# Patient Record
Sex: MALE | Race: WHITE | NOT HISPANIC OR LATINO | Employment: FULL TIME | URBAN - NONMETROPOLITAN AREA
[De-identification: names, ages, dates, MRNs, and addresses within clinical notes are randomized per-mention and may not be internally consistent; named-entity substitution may affect disease eponyms.]

---

## 2018-03-07 ENCOUNTER — APPOINTMENT (EMERGENCY)
Dept: RADIOLOGY | Facility: HOSPITAL | Age: 28
End: 2018-03-07
Payer: COMMERCIAL

## 2018-03-07 ENCOUNTER — HOSPITAL ENCOUNTER (EMERGENCY)
Facility: HOSPITAL | Age: 28
Discharge: HOME | End: 2018-03-07
Attending: EMERGENCY MEDICINE | Admitting: EMERGENCY MEDICINE
Payer: COMMERCIAL

## 2018-03-07 VITALS
SYSTOLIC BLOOD PRESSURE: 138 MMHG | HEART RATE: 105 BPM | DIASTOLIC BLOOD PRESSURE: 97 MMHG | RESPIRATION RATE: 18 BRPM | BODY MASS INDEX: 49.34 KG/M2 | WEIGHT: 315 LBS | OXYGEN SATURATION: 97 % | TEMPERATURE: 99.2 F
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
## 2018-03-07 DIAGNOSIS — Z23 RABIES, NEED FOR PROPHYLACTIC VACCINATION AGAINST: ICD-10-CM

## 2018-03-07 DIAGNOSIS — S81.052A: Primary | ICD-10-CM

## 2018-03-07 PROCEDURE — 96372 THER/PROPH/DIAG INJ SC/IM: CPT

## 2018-03-07 PROCEDURE — 90715 TDAP VACCINE 7 YRS/> IM: CPT | Performed by: EMERGENCY MEDICINE

## 2018-03-07 PROCEDURE — 73590 X-RAY EXAM OF LOWER LEG: CPT

## 2018-03-07 PROCEDURE — 90376 RABIES IG HEAT TREATED: CPT | Performed by: EMERGENCY MEDICINE

## 2018-03-07 PROCEDURE — 73564 X-RAY EXAM KNEE 4 OR MORE: CPT

## 2018-03-07 PROCEDURE — 90675 RABIES VACCINE IM: CPT | Performed by: EMERGENCY MEDICINE

## 2018-03-07 PROCEDURE — 99283 EMERGENCY DEPT VISIT LOW MDM: CPT

## 2018-03-07 PROCEDURE — 90472 IMMUNIZATION ADMIN EACH ADD: CPT

## 2018-03-07 PROCEDURE — 90471 IMMUNIZATION ADMIN: CPT

## 2018-03-07 RX ORDER — CLINDAMYCIN HYDROCHLORIDE 150 MG/1
450 CAPSULE ORAL EVERY 8 HOURS SCHEDULED
Qty: 90 CAPSULE | Refills: 0 | Status: SHIPPED | OUTPATIENT
Start: 2018-03-07 | End: 2018-03-17

## 2018-03-07 RX ORDER — IBUPROFEN 400 MG/1
400 TABLET ORAL ONCE
Status: DISCONTINUED | OUTPATIENT
Start: 2018-03-07 | End: 2018-03-07 | Stop reason: HOSPADM

## 2018-03-07 RX ORDER — CLINDAMYCIN HYDROCHLORIDE 150 MG/1
450 CAPSULE ORAL ONCE
Status: COMPLETED | OUTPATIENT
Start: 2018-03-07 | End: 2018-03-07

## 2018-03-07 RX ORDER — GINSENG 100 MG
1 CAPSULE ORAL ONCE
Status: COMPLETED | OUTPATIENT
Start: 2018-03-07 | End: 2018-03-07

## 2018-03-07 RX ORDER — ONDANSETRON 4 MG/1
4 TABLET, ORALLY DISINTEGRATING ORAL ONCE
Status: COMPLETED | OUTPATIENT
Start: 2018-03-07 | End: 2018-03-07

## 2018-03-07 RX ADMIN — HUMAN RABIES VIRUS IMMUNE GLOBULIN 3600 UNITS: 150 INJECTION, SOLUTION INTRAMUSCULAR at 18:06

## 2018-03-07 RX ADMIN — ONDANSETRON 4 MG: 4 TABLET, ORALLY DISINTEGRATING ORAL at 18:12

## 2018-03-07 RX ADMIN — TETANUS TOXOID, REDUCED DIPHTHERIA TOXOID AND ACELLULAR PERTUSSIS VACCINE, ADSORBED 0.5 ML: 5; 2.5; 8; 8; 2.5 SUSPENSION INTRAMUSCULAR at 18:07

## 2018-03-07 RX ADMIN — BACITRACIN 1 LARGE APPLICATION: 500 OINTMENT TOPICAL at 18:07

## 2018-03-07 RX ADMIN — CLINDAMYCIN HYDROCHLORIDE 450 MG: 150 CAPSULE ORAL at 18:09

## 2018-03-07 RX ADMIN — RABIES VACCINE 1 ML: KIT at 18:08

## 2018-03-07 NOTE — ED PROVIDER NOTES
History  Chief Complaint   Patient presents with    Dog Bite     Patient got bit in the left leg by a dog that was walking by him with its owners  25-year-old male was walking on the sidewalk when an owner in his dog were approaching the opposite direction the dog bit him in the left calf he did not sustain a fall this occurred a couple hours prior to arrival   He is complaining of localized pain to the area no increased pain with weight-bearing but he can feel it swelling  He is not sure of his last tetanus immunization  Police were involved but he is not sure what his dog was immunized he appeared to be behaving normally until he was bit he denies any numbness tingling there is just localized pain to the area of the bite  No generalized knee pain no hip pain no increased pain with movement of his ankle  None       Past Medical History:   Diagnosis Date    Asthma        History reviewed  No pertinent surgical history  History reviewed  No pertinent family history  I have reviewed and agree with the history as documented  Social History   Substance Use Topics    Smoking status: Never Smoker    Smokeless tobacco: Not on file    Alcohol use Yes        Review of Systems   Constitutional: Negative for activity change, chills and fever  Skin: Positive for wound  Neurological: Negative for weakness and numbness  All other systems reviewed and are negative  Physical Exam  ED Triage Vitals [03/07/18 1646]   Temperature Pulse Respirations Blood Pressure SpO2   99 2 °F (37 3 °C) 105 18 138/97 97 %      Temp Source Heart Rate Source Patient Position - Orthostatic VS BP Location FiO2 (%)   Temporal Monitor -- -- --      Pain Score       No Pain           Orthostatic Vital Signs  Vitals:    03/07/18 1646   BP: 138/97   Pulse: 105       Physical Exam   Constitutional: He is oriented to person, place, and time  He appears well-developed and well-nourished  No distress     HENT:   Head: Normocephalic and atraumatic  Right Ear: External ear normal    Left Ear: External ear normal    Musculoskeletal: Normal range of motion  He exhibits edema (localized around bite)  He exhibits no tenderness or deformity  Left hip: Normal         Left knee: He exhibits swelling  He exhibits normal range of motion, no effusion, no ecchymosis, no deformity, no laceration (puncture wounds), no erythema, normal alignment, no LCL laxity, normal patellar mobility, no bony tenderness, normal meniscus and no MCL laxity  No tenderness found  No medial joint line, no lateral joint line, no MCL, no LCL and no patellar tendon tenderness noted  Left ankle: He exhibits normal range of motion, no swelling, no ecchymosis, no deformity, no laceration and normal pulse  No tenderness  No lateral malleolus, no medial malleolus, no AITFL, no CF ligament, no posterior TFL, no head of 5th metatarsal and no proximal fibula tenderness found  Achilles tendon normal  Achilles tendon exhibits no pain, no defect and normal Gibbs's test results  Left lower leg: He exhibits swelling  He exhibits no tenderness, no bony tenderness, no deformity and no laceration (pucture wounds)  Legs:  LLE : no joint effusion  Able to flex hip with knee fully extended  Patellar apprehension test negative  Able to flex to 70 degress and extend to 180 degress  No increased pain with flexion and ext of foot   Neurological: He is oriented to person, place, and time  No cranial nerve deficit or sensory deficit  He exhibits normal muscle tone  Coordination normal    Skin: Capillary refill takes less than 2 seconds  Psychiatric: He has a normal mood and affect  Vitals reviewed        ED Medications  Medications   ibuprofen (MOTRIN) tablet 400 mg (400 mg Oral Not Given 3/7/18 1833)   clindamycin (CLEOCIN) capsule 450 mg (450 mg Oral Given 3/7/18 1809)   tetanus-diphtheria-acellular pertussis (BOOSTRIX) IM injection 0 5 mL (0 5 mL Intramuscular Given 3/7/18 1807)   rabies vaccine, chick embryo (RABAVERT) IM injection 1 mL (1 mL Intramuscular Given 3/7/18 1808)   rabies immune globulin, human (IMOGAM RABIES-HT) IM injection 3,600 Units (3,600 Units Infiltration Given 3/7/18 1806)   bacitracin topical ointment 1 large application (1 large application Topical Given 3/7/18 1807)   ondansetron (ZOFRAN-ODT) dispersible tablet 4 mg (4 mg Oral Given 3/7/18 1812)       Diagnostic Studies  Results Reviewed     None                 XR tibia fibula 2 views LEFT   Final Result by Karol Rutledge MD (03/07 1729)      No acute osseous abnormality  Workstation performed: [id number]         XR knee 4+ views left injury   Final Result by Karol Rutledge MD (03/07 1728)      No acute osseous abnormality  Workstation performed: [id number]                    Procedures  Procedures       Phone Contacts  ED Phone Contact    ED Course  ED Course as of Mar 07 2013   Wed Mar 07, 2018   6062 Reviewed rabies vaccination schedule 3 days ( 3/10/18);  day 7 (3/14/18) and day 14 (3/21/18) patient verbalized understanding  MDM  Number of Diagnoses or Management Options  Dog bite of knee, left, initial encounter:   Rabies, need for prophylactic vaccination against:   Diagnosis management comments: MDM: secondary to unprovoked attack unsure if will be able to f/u on animal will proceed with rabies vaccination/PEP; no clinical evidence of compartment syndrome    CritCare Time    Disposition  Final diagnoses:   Dog bite of knee, left, initial encounter - and left posterior lateral calf   Rabies, need for prophylactic vaccination against     Time reflects when diagnosis was documented in both MDM as applicable and the Disposition within this note     Time User Action Codes Description Comment    3/7/2018  5:30  [address],  [facility]  0XXA] Dog bite of knee, left, initial encounter     3/7/2018  5:30 PM Leda Brandon Modify [G63 110D,  J45  0XXA] Dog bite of knee, left, initial encounter and left posterior lateral calf    3/7/2018  5:31 PM Dwaine Babin Add [Z23] Rabies, need for prophylactic vaccination against       ED Disposition     ED Disposition Condition Comment    Discharge  Shirley Schafer discharge to home/self care  Condition at discharge: Good        Follow-up Information     Follow up With Specialties Details [place] [address] Emergency Department Emergency Medicine  return on Day 3 (3/10/18) Day 7 (3/14/18) and Day 14 (3/21/18) to complete rabies vaccination [place] [id number]  [phone] [place] [facility], [address],  Niels Hitesh Breaux MD Orthopedic Surgery  knee pan or swelling 2018 [address], [place] [address]  [phone]           Discharge Medication List as of 3/7/2018  6:18 PM      START taking these medications    Details   clindamycin (CLEOCIN) 150 mg capsule Take 3 capsules (450 mg total) by mouth every 8 (eight) hours for 10 days, Starting Wed 3/7/2018, Until Sat 3/17/2018, Print           No discharge procedures on file      ED Provider  Electronically Signed by           Sheri Mustafa MD  03/07/18 2013

## 2018-03-07 NOTE — DISCHARGE INSTRUCTIONS
Animal Bite   WHAT YOU NEED TO KNOW:   Animal bite injuries range from shallow cuts to deep, life-threatening wounds  An animal can cut or puncture the skin when it bites  Your skin may be torn from your body  Your skin may swell or bruise even if the bite does not break the skin  Animal bites occur more often on the hands, arms, legs, and face  Bites from dogs and cats are the most common injuries  DISCHARGE INSTRUCTIONS:   Return to the emergency department if:   · You have a fever  · Your wound is red, swollen, and draining pus  · You see red streaks on the skin around the wound  · You can no longer move the bitten area  · Your heartbeat and breathing are much faster than usual     · You feel dizzy and confused  Contact your healthcare provider if:   · Your pain does not get better, even after you take pain medicine  · You have nightmares or flashbacks about the animal bite  · You have questions or concerns about your condition or care  Medicines: You may need any of the following:  · Antibiotics  prevent or treat a bacterial infection  · Prescription pain medicine  may be given  Ask how to take this medicine safely  · A tetanus vaccine  may be needed to prevent tetanus  Tetanus is a life-threatening bacterial infection that affects the nerves and muscles  The bacteria can be spread through animal bites  · A rabies vaccine  may be needed to prevent rabies  Rabies is a life-threatening viral infection  The virus can be spread through animal bites  · Take your medicine as directed  Contact your healthcare provider if you think your medicine is not helping or if you have side effects  Tell him of her if you are allergic to any medicine  Keep a list of the medicines, vitamins, and herbs you take  Include the amounts, and when and why you take them  Bring the list or the pill bottles to follow-up visits  Carry your medicine list with you in case of an emergency    Follow up with your healthcare provider in 1 to 2 days: You may need to return to have your stitches removed  Write down your questions so you remember to ask them during your visits  Self-care:   · Apply antibiotic ointment as directed  This helps prevent infection in minor skin wounds  It is available without a doctor's order  · Keep the wound clean and covered  Wash the wound every day with soap and water or germ-killing cleanser  Ask your healthcare provider about the kinds of bandages to use  · Apply ice on your wound  Ice helps decrease swelling and pain  Ice may also help prevent tissue damage  Use an ice pack, or put crushed ice in a plastic bag  Cover it with a towel and place it on your wound for 15 to 20 minutes every hour or as directed  · Elevate the wound area  Raise your wound above the level of your heart as often as you can  This will help decrease swelling and pain  Prop your wound on pillows or blankets to keep it elevated comfortably  Prevent another animal bite:   · Learn to recognize the signs of a scared or angry pet  Avoid quick, sudden movements  · Do not step between animals that are fighting  · Do not leave a pet alone with a young child  · Do not disturb an animal while it eats, sleeps, or cares for its young  · Do not approach an animal you do not know, especially one that is tied up or caged  · Stay away from animals that seem sick or act strangely  · Do not feed or capture wild animals  © 2017 [address] Information is for End User's use only and may not be sold, redistributed or otherwise used for commercial purposes  All illustrations and images included in CareNotes® are the copyrighted property of [employer]  or [employer]  The above information is an  only  It is not intended as medical advice for individual conditions or treatments   Talk to your doctor, nurse or pharmacist before following any medical regimen to see if it is safe and effective for you  Rabies Vaccine   WHAT YOU NEED TO KNOW:   The rabies vaccine is an injection given to help prevent a rabies virus infection  The virus is spread to humans through the bite of an infected animal  Dogs, bats, skunks, coyotes, raccoons, and foxes are examples of animals that can carry rabies  The rabies vaccine can protect you from being infected with the virus  The vaccine can also prevent you from developing rabies even if you get it after you were bitten by an animal    DISCHARGE INSTRUCTIONS:   Call 911 for any of the following:   · Your mouth and throat are swollen  · You are wheezing or have trouble breathing  · You have chest pain or your heart is beating faster than normal for you  · You feel like you are going to faint  Return to the emergency department if:   · Your face is red or swollen  · You have hives that spread over your body  · You feel weak or dizzy  Contact your healthcare provider if:   · You have increased pain, redness, or swelling around the area where the shot was given  · You have questions or concerns about the rabies vaccine  Apply a warm compress  to the injection area as directed to decrease pain and swelling  Follow up with your healthcare provider as directed:  Write down your questions so you remember to ask them during your visits  © 2017 [address] [employer]  Information is for End User's use only and may not be sold, redistributed or otherwise used for commercial purposes  All illustrations and images included in CareNotes® are the copyrighted property of A D A M [employer]  or [employer]  The above information is an  only  It is not intended as medical advice for individual conditions or treatments  Talk to your doctor, nurse or pharmacist before following any medical regimen to see if it is safe and effective for you      Complete 10 days of clindamycin  Bacitracin to wound twice daily  Eat yogurt, take pro-biotic over the counter, or acidophilus tablet (in vitamin aisle) to prevent diarrhea

## 2018-03-08 ENCOUNTER — HOSPITAL ENCOUNTER (EMERGENCY)
Facility: HOSPITAL | Age: 28
Discharge: HOME | End: 2018-03-08
Admitting: EMERGENCY MEDICINE
Payer: COMMERCIAL

## 2018-03-08 VITALS
BODY MASS INDEX: 38.36 KG/M2 | HEIGHT: 76 IN | DIASTOLIC BLOOD PRESSURE: 76 MMHG | OXYGEN SATURATION: 94 % | SYSTOLIC BLOOD PRESSURE: 138 MMHG | WEIGHT: 315 LBS | RESPIRATION RATE: 20 BRPM | HEART RATE: 112 BPM | TEMPERATURE: 99.9 F

## 2018-03-08 DIAGNOSIS — R68.89 INFLUENZA-LIKE SYMPTOMS: Primary | ICD-10-CM

## 2018-03-08 PROCEDURE — 99283 EMERGENCY DEPT VISIT LOW MDM: CPT

## 2018-03-08 RX ORDER — ALBUTEROL SULFATE 90 UG/1
2 AEROSOL, METERED RESPIRATORY (INHALATION) EVERY 6 HOURS PRN
Qty: 1 INHALER | Refills: 0 | Status: SHIPPED | OUTPATIENT
Start: 2018-03-08 | End: 2018-03-16

## 2018-03-08 RX ORDER — FLUTICASONE PROPIONATE 50 MCG
2 SPRAY, SUSPENSION (ML) NASAL DAILY
Qty: 16 G | Refills: 0 | Status: SHIPPED | OUTPATIENT
Start: 2018-03-08 | End: 2018-03-16

## 2018-03-08 RX ORDER — IBUPROFEN 800 MG/1
800 TABLET ORAL EVERY 6 HOURS PRN
Qty: 30 TABLET | Refills: 0 | Status: SHIPPED | OUTPATIENT
Start: 2018-03-08 | End: 2018-03-16

## 2018-03-08 RX ORDER — ACETAMINOPHEN 500 MG
1000 TABLET ORAL EVERY 6 HOURS PRN
Qty: 30 TABLET | Refills: 0 | Status: SHIPPED | OUTPATIENT
Start: 2018-03-08 | End: 2018-03-16

## 2018-03-08 RX ORDER — IBUPROFEN 600 MG/1
600 TABLET ORAL ONCE
Status: COMPLETED | OUTPATIENT
Start: 2018-03-08 | End: 2018-03-08

## 2018-03-08 RX ADMIN — IBUPROFEN 600 MG: 600 TABLET, FILM COATED ORAL at 13:21

## 2018-03-08 NOTE — DISCHARGE INSTRUCTIONS

## 2018-03-10 NOTE — ED PROVIDER NOTES
History  Chief Complaint   Patient presents with    Fever - 9 weeks to 74 years     Pt reports fever of 102 7 at 10am and took Tylenol at 9am  Pt reports bodyaches  Pt was seen last night and given the rabies series for a dog bite on the left leg  32 yr male with dog bite to left lower leg last evening, seen in ED received boostrix, rabavert, rabies immune globulin  Taking clindamycin for bite wound  His leg feels same since last night, no drainage/bleeding  Otherwise healthy, to his knowledge no prior adverse effects of meds or vaccines in the past  Dose of hrig confirmed last night with cdc/pharmacy due to pt's weight  99 9 tmax last night with body aches and chills  Took 1000mg tylenol    102 tmax this morning took additional 1000mg tylenol  Does have nasal congestion with cough yellow/white sputum for past 3 days, new sx of fever  Cough and subjective wheezing at home  No ear pain or sore throat  No headache neck pain vision disturbance chest pain belly pain n/v/d or urinary sx or rash  Did not get flu vaccine in >10 years  Encourage pt to continue supportive care- add ibuprofen, flonase, and albuterol to the tylenol he is already taking  History provided by:  Patient      Prior to Admission Medications   Prescriptions Last Dose Informant Patient Reported? Taking? clindamycin (CLEOCIN) 150 mg capsule   No Yes   Sig: Take 3 capsules (450 mg total) by mouth every 8 (eight) hours for 10 days   Patient taking differently: Take 450 mg by mouth every 8 (eight) hours Pt did not start taking yet was prescribed last night       Facility-Administered Medications: None       Past Medical History:   Diagnosis Date    Asthma        Past Surgical History:   Procedure Laterality Date    NO PAST SURGERIES         History reviewed  No pertinent family history  I have reviewed and agree with the history as documented      Social History   Substance Use Topics    Smoking status: Never Smoker    Smokeless tobacco: Never Used    Alcohol use Yes        Review of Systems   Constitutional: Positive for fatigue and fever  Negative for activity change, appetite change, chills, diaphoresis and unexpected weight change  HENT: Positive for congestion and rhinorrhea  Negative for ear pain, sinus pressure, sore throat and tinnitus  Eyes: Negative for visual disturbance  Respiratory: Negative for cough, chest tightness, shortness of breath and wheezing  Cardiovascular: Negative for chest pain, palpitations and leg swelling  Gastrointestinal: Negative for abdominal pain, constipation, diarrhea, nausea and vomiting  Genitourinary: Negative for dysuria, flank pain, frequency, hematuria and urgency  Musculoskeletal: Positive for myalgias  Negative for arthralgias and back pain  Skin: Negative for rash and wound  Neurological: Negative for dizziness, tremors, syncope and headaches  Physical Exam  ED Triage Vitals [03/08/18 1146]   Temperature Pulse Respirations Blood Pressure SpO2   99 9 °F (37 7 °C) (!) 107 17 138/76 95 %      Temp Source Heart Rate Source Patient Position - Orthostatic VS BP Location FiO2 (%)   Temporal Monitor Sitting Right arm --      Pain Score       No Pain           Orthostatic Vital Signs  Vitals:    03/08/18 1146 03/08/18 1300   BP: 138/76    Pulse: (!) 107 (!) 112   Patient Position - Orthostatic VS: Sitting        Physical Exam   Constitutional: He is oriented to person, place, and time  Vital signs are normal  He appears well-developed and well-nourished  Non-toxic appearance  He does not appear ill  No distress  Morbidly obese   HENT:   Head: Normocephalic and atraumatic  Right Ear: Tympanic membrane and external ear normal    Left Ear: Tympanic membrane and external ear normal    Nose: Mucosal edema and rhinorrhea present  Mouth/Throat: Uvula is midline  No oral lesions  No uvula swelling  Posterior oropharyngeal erythema present     Eyes: Conjunctivae, EOM and lids are normal  Pupils are equal, round, and reactive to light  Right eye exhibits no discharge  Left eye exhibits no discharge  Neck: Normal range of motion and full passive range of motion without pain  Neck supple  No JVD present  No thyromegaly present  Cardiovascular: Normal rate, regular rhythm, normal heart sounds, intact distal pulses and normal pulses  No murmur heard  Pulmonary/Chest: Effort normal and breath sounds normal  No accessory muscle usage  No tachypnea  No respiratory distress  He has no decreased breath sounds  He has no wheezes  He has no rhonchi  He has no rales  He exhibits no tenderness  Abdominal: Soft  Normal appearance and bowel sounds are normal  He exhibits no distension  There is no hepatosplenomegaly  There is no tenderness  There is no rebound and no CVA tenderness  No hernia  Musculoskeletal: Normal range of motion  He exhibits no tenderness  Left knee: Normal         Left ankle: Normal         Legs:  Lymphadenopathy:     He has no cervical adenopathy  Neurological: He is alert and oriented to person, place, and time  No cranial nerve deficit or sensory deficit  Skin: Skin is warm, dry and intact  Capillary refill takes less than 2 seconds  No rash noted  He is not diaphoretic  No erythema  No pallor  Psychiatric: He has a normal mood and affect  His speech is normal and behavior is normal    Nursing note and vitals reviewed  ED Medications  Medications   ibuprofen (MOTRIN) tablet 600 mg (600 mg Oral Given 3/8/18 1321)       Diagnostic Studies  Results Reviewed     None                 No orders to display              Procedures  Procedures       Phone Contacts  ED Phone Contact    ED Course  ED Course as of Mar 10 1228   u Mar 08, 2018   1251 32 yr male with dog bite to left lower leg last evening, seen in ED received boostrix, rabavert, rabies immune globulin  Taking clindamycin for bite wound   His leg feels swollen since last night, no drainage/bleeding  Otherwise healthy, to his knowledge no prior adverse effects of meds or vaccines in the past     99 9 tmax last night with body aches and chills  Took 1000mg tylenol    102 tmax this morning took additional 1000mg tylenol  Does have nasal congestion with cough yellow/white sputum for past 3 days, new sx of fever  Cough and wheezing at home  No ear pain or sore throat  No headache neck pain vision disturbance chest pain belly pain n/v/d or urinary sx or rash  MDM  Number of Diagnoses or Management Options  Influenza-like symptoms: new and does not require workup  Patient Progress  Patient progress: stable    CritCare Time    Disposition  Final diagnoses:   Influenza-like symptoms     Time reflects when diagnosis was documented in both MDM as applicable and the Disposition within this note     Time User Action Codes Description Comment    3/8/2018  1:10 PM Randi Rain [R68 89] Flu-like symptoms     3/8/2018  1:10 PM Wadley Grates Add [R68 89] Influenza-like symptoms     3/8/2018  1:10 PM Wadley Grates Modify [R68 89] Influenza-like symptoms     3/8/2018  1:10 PM Wadley Grates Remove [R68 89] Flu-like symptoms       ED Disposition     ED Disposition Condition Comment    Discharge  Maxwell Glendy discharge to home/self care      Condition at discharge: Good        Follow-up Information     Follow up With Specialties Details Why [address], DO Family Medicine Schedule an appointment as soon as possible for a visit in 1 day Seen in ER need followup for illness [address] [place] [address]  [place] [address]  [phone]          Discharge Medication List as of 3/8/2018  1:16 PM      START taking these medications    Details   acetaminophen (TYLENOL) 500 mg tablet Take 2 tablets (1,000 mg total) by mouth every 6 (six) hours as needed for mild pain, moderate pain, severe pain, headaches or fever, Starting Thu 3/8/2018, Normal      albuterol (PROVENTIL HFA,VENTOLIN HFA) 90 mcg/act inhaler Inhale 2 puffs every 6 (six) hours as needed for wheezing, Starting Thu 3/8/2018, Normal      fluticasone (FLONASE) 50 mcg/act nasal spray 2 sprays into each nostril daily, Starting Thu 3/8/2018, Normal      ibuprofen (MOTRIN) 800 mg tablet Take 1 tablet (800 mg total) by mouth every 6 (six) hours as needed for mild pain, moderate pain, fever or headaches, Starting Thu 3/8/2018, Normal         CONTINUE these medications which have NOT CHANGED    Details   clindamycin (CLEOCIN) 150 mg capsule Take 3 capsules (450 mg total) by mouth every 8 (eight) hours for 10 days, Starting Wed 3/7/2018, Until Sat 3/17/2018, Print           No discharge procedures on file      ED Provider  Electronically Signed by           Kiana Chaudhari PA-C  03/10/18 1227

## 2018-03-16 ENCOUNTER — HOSPITAL ENCOUNTER (EMERGENCY)
Facility: HOSPITAL | Age: 28
Discharge: HOME | End: 2018-03-16
Attending: EMERGENCY MEDICINE | Admitting: EMERGENCY MEDICINE
Payer: COMMERCIAL

## 2018-03-16 VITALS
HEIGHT: 76 IN | SYSTOLIC BLOOD PRESSURE: 153 MMHG | HEART RATE: 98 BPM | DIASTOLIC BLOOD PRESSURE: 79 MMHG | OXYGEN SATURATION: 97 % | TEMPERATURE: 97.7 F | WEIGHT: 315 LBS | BODY MASS INDEX: 38.36 KG/M2 | RESPIRATION RATE: 18 BRPM

## 2018-03-16 DIAGNOSIS — Z23 NEED FOR RABIES VACCINATION: Primary | ICD-10-CM

## 2018-03-16 PROCEDURE — 90675 RABIES VACCINE IM: CPT | Performed by: EMERGENCY MEDICINE

## 2018-03-16 PROCEDURE — 90471 IMMUNIZATION ADMIN: CPT

## 2018-03-16 PROCEDURE — 99281 EMR DPT VST MAYX REQ PHY/QHP: CPT

## 2018-03-16 RX ADMIN — RABIES VACCINE 1 ML: KIT at 08:52

## 2018-03-16 NOTE — DISCHARGE INSTRUCTIONS
Rabies Vaccine   WHAT YOU NEED TO KNOW:   The rabies vaccine is an injection given to help prevent a rabies virus infection  The virus is spread to humans through the bite of an infected animal  Dogs, bats, skunks, coyotes, raccoons, and foxes are examples of animals that can carry rabies  The rabies vaccine can protect you from being infected with the virus  The vaccine can also prevent you from developing rabies even if you get it after you were bitten by an animal    DISCHARGE INSTRUCTIONS:   Call 911 for any of the following:   · Your mouth and throat are swollen  · You are wheezing or have trouble breathing  · You have chest pain or your heart is beating faster than normal for you  · You feel like you are going to faint  Return to the emergency department if:   · Your face is red or swollen  · You have hives that spread over your body  · You feel weak or dizzy  Contact your healthcare provider if:   · You have increased pain, redness, or swelling around the area where the shot was given  · You have questions or concerns about the rabies vaccine  Apply a warm compress  to the injection area as directed to decrease pain and swelling  Follow up with your healthcare provider as directed:  Write down your questions so you remember to ask them during your visits  © 2017 [facility] [facility] Information is for End User's use only and may not be sold, redistributed or otherwise used for commercial purposes  All illustrations and images included in CareNotes® are the copyrighted property of [employer]  or [address]  The above information is an  only  It is not intended as medical advice for individual conditions or treatments  Talk to your doctor, nurse or pharmacist before following any medical regimen to see if it is safe and effective for you

## 2018-03-16 NOTE — ED PROVIDER NOTES
History  Chief Complaint   Patient presents with    Follow Up Rabies     Patient here for third round of rabies shot, bit on left calf     59-year-old male was bit by a dog ten days ago  He underwent rabies vaccination here  He received rabies vaccine and immunoglobulin  His tetanus was also updated  He presents today for the 3rd rabies vaccination  Today is day 10  He is doing well  The bite site to the left calf looks good  There is no redness or drainage  No fever or chills  Prior to Admission Medications   Prescriptions Last Dose Informant Patient Reported? Taking? clindamycin (CLEOCIN) 150 mg capsule 3/15/2018 at Unknown time  No Yes   Sig: Take 3 capsules (450 mg total) by mouth every 8 (eight) hours for 10 days   Patient taking differently: Take 450 mg by mouth every 8 (eight) hours Pt did not start taking yet was prescribed last night       Facility-Administered Medications: None       Past Medical History:   Diagnosis Date    Asthma        Past Surgical History:   Procedure Laterality Date    NO PAST SURGERIES         History reviewed  No pertinent family history  I have reviewed and agree with the history as documented  Social History   Substance Use Topics    Smoking status: Never Smoker    Smokeless tobacco: Never Used    Alcohol use Yes        Review of Systems   Constitutional: Negative for chills and fever  Skin: Positive for wound  Neurological: Negative for weakness and numbness  All other systems reviewed and are negative        Physical Exam  ED Triage Vitals [03/16/18 0833]   Temperature Pulse Respirations Blood Pressure SpO2   97 7 °F (36 5 °C) 98 18 153/79 97 %      Temp Source Heart Rate Source Patient Position - Orthostatic VS BP Location FiO2 (%)   Temporal Monitor Sitting Left arm --      Pain Score       No Pain           Orthostatic Vital Signs  Vitals:    03/16/18 0833   BP: 153/79   Pulse: 98   Patient Position - Orthostatic VS: Sitting Physical Exam   Constitutional: He is oriented to person, place, and time  He appears well-developed and well-nourished  No distress  Obese male  HENT:   Head: Normocephalic and atraumatic  Eyes: Conjunctivae are normal  Right eye exhibits no discharge  Left eye exhibits no discharge  Neck: Normal range of motion  Neck supple  Pulmonary/Chest: Effort normal  No respiratory distress  Musculoskeletal: Normal range of motion  He exhibits no deformity  There is a scabbed bite to the left calf  There is no swelling  No erythema  No drainage  No significant tenderness  Neurological: He is alert and oriented to person, place, and time  Skin: Skin is warm and dry  Psychiatric: He has a normal mood and affect  His behavior is normal    Vitals reviewed  ED Medications  Medications   rabies vaccine, chick embryo (RABAVERT) IM injection 1 mL (not administered)       Diagnostic Studies  Results Reviewed     None                 No orders to display              Procedures  Procedures       Phone Contacts  ED Phone Contact    ED Course  ED Course                                MDM  Number of Diagnoses or Management Options  Diagnosis management comments: No infection at the bite site  Appropriate for continued outpatient management  CritCare Time    Disposition  Final diagnoses:   Need for rabies vaccination     Time reflects when diagnosis was documented in both MDM as applicable and the Disposition within this note     Time User Action Codes Description Comment    3/16/2018  8:46 AM Demond Gibbs Add [Z23] Need for rabies vaccination       ED Disposition     ED Disposition Condition Comment    Discharge  Jenifer Carter discharge to home/self care  Condition at discharge: Good        Follow-up Information     Follow up With Specialties Details Why Contact Info       Return to emergency room in 1 week for last rabies vaccination    Return sooner if any problems         Patient's Medications   Discharge Prescriptions    No medications on file     No discharge procedures on file      ED Provider  Electronically Signed by           Luana Mai MD  03/16/18 6299

## 2020-02-08 ENCOUNTER — OFFICE VISIT (OUTPATIENT)
Dept: URGENT CARE | Facility: CLINIC | Age: 30
End: 2020-02-08
Payer: COMMERCIAL

## 2020-02-08 VITALS
BODY MASS INDEX: 39.17 KG/M2 | DIASTOLIC BLOOD PRESSURE: 78 MMHG | OXYGEN SATURATION: 96 % | HEIGHT: 75 IN | HEART RATE: 102 BPM | TEMPERATURE: 102.2 F | WEIGHT: 315 LBS | RESPIRATION RATE: 18 BRPM | SYSTOLIC BLOOD PRESSURE: 164 MMHG

## 2020-02-08 DIAGNOSIS — R68.89 INFLUENZA-LIKE SYMPTOMS: Primary | ICD-10-CM

## 2020-02-08 PROCEDURE — 99214 OFFICE O/P EST MOD 30 MIN: CPT | Performed by: NURSE PRACTITIONER

## 2020-02-08 RX ORDER — OSELTAMIVIR PHOSPHATE 75 MG/1
75 CAPSULE ORAL 2 TIMES DAILY
Qty: 10 CAPSULE | Refills: 0 | Status: SHIPPED | OUTPATIENT
Start: 2020-02-08 | End: 2020-02-13

## 2020-02-08 NOTE — LETTER
February 8, 2020     Patient: Karan Parks   YOB: 1990   Date of Visit: 2/8/2020       To Whom It May Concern: It is my medical opinion that Karan Parks may return to work on 2/15/2020   If you have any questions or concerns, please don't hesitate to call           Sincerely,        TORI Mcdowell    CC: No Recipients

## 2020-02-08 NOTE — PROGRESS NOTES
[id number]        NAME: Tavo Serrano is a 34 y o  male  : 1990    MRN: [id number]  DATE: 2020  TIME: 9:35 AM    Assessment and Plan   Influenza-like symptoms [R68 89]  1  Influenza-like symptoms  oseltamivir (TAMIFLU) 75 mg capsule         Patient Instructions       Follow up with PCP in 3-5 days  Proceed to  ER if symptoms worsen  You appear to have influenza - your clinical presentation represents the flu  You have been prescribed tamiflu - start today and take as prescsribed  Increase water - gatorade  Rest   Tylenol every 4-6 hours and motrin every 6-8 hours  Robitussin DM for cough or dayquil  Follow up with your PCP  Go to the ED if symptoms worsen  No work x 1 week        Chief Complaint     Chief Complaint   Patient presents with    Flu Symptoms     x 3 days         History of Present Illness       This is a morbidly obese 34year old male who states has had fever (here 102 2) cough, congestion, body aches x 2 days  Denies flu shot this year  Denies others at home are ill  States took dayquil x 1 last night  States has to work today and tomorrow  Flu Symptoms   Associated symptoms include chills, congestion, coughing, fatigue, a fever, myalgias and a sore throat  Review of Systems   Review of Systems   Constitutional: Positive for chills, fatigue and fever  HENT: Positive for congestion and sore throat  Eyes: Negative  Respiratory: Positive for cough  Cardiovascular: Negative  Gastrointestinal: Negative  Endocrine: Negative  Genitourinary: Negative  Musculoskeletal: Positive for myalgias  Skin: Negative  Allergic/Immunologic: Negative  Neurological: Negative  Hematological: Negative  Psychiatric/Behavioral: Negative            Current Medications       Current Outpatient Medications:     oseltamivir (TAMIFLU) 75 mg capsule, Take 1 capsule (75 mg total) by mouth 2 (two) times a day for 5 days, Disp: 10 capsule, Rfl: 0    Current Allergies     Allergies as of 02/08/2020 - Reviewed 02/08/2020   Allergen Reaction Noted    Amoxicillin  01/25/2016    Penicillin g  03/17/2014            The following portions of the patient's history were reviewed and updated as appropriate: allergies, current medications, past family history, past medical history, past social history, past surgical history and problem list      Past Medical History:   Diagnosis Date    Asthma        Past Surgical History:   Procedure Laterality Date    NO PAST SURGERIES         History reviewed  No pertinent family history  Medications have been verified  Objective   /78   Pulse 102   Temp (!) 102 2 °F (39 °C)   Resp 18   Ht 6' 3" (1 905 m)   Wt (!) 181 kg (400 lb)   SpO2 96%   BMI 50 00 kg/m²        Physical Exam     Physical Exam   Constitutional: He is oriented to person, place, and time  He appears well-developed and well-nourished  No distress  Appears not to feel well but is not toxic    HENT:   Head: Normocephalic and atraumatic  Right Ear: External ear normal    Left Ear: External ear normal    Nose: Nose normal    Mouth/Throat: No oropharyngeal exudate    + oropharyngeal erythema     Eyes: Pupils are equal, round, and reactive to light  EOM are normal    Neck: Normal range of motion  Neck supple  Cardiovascular: Normal rate, regular rhythm and normal heart sounds  Pulmonary/Chest: Effort normal and breath sounds normal    Abdominal: Soft  Bowel sounds are normal  He exhibits no distension  There is no tenderness  Musculoskeletal: Normal range of motion  Neurological: He is alert and oriented to person, place, and time  Skin: Skin is warm and dry  Capillary refill takes less than 2 seconds  He is not diaphoretic  Psychiatric: He has a normal mood and affect  His behavior is normal  Judgment and thought content normal    Nursing note and vitals reviewed

## 2020-02-08 NOTE — PATIENT INSTRUCTIONS
You appear to have influenza - your clinical presentation represents the flu  You have been prescribed tamiflu - start today and take as prescsribed  Increase water - gatorade  Rest   Tylenol every 4-6 hours and motrin every 6-8 hours  Robitussin DM for cough or dayquil  Follow up with your PCP  Go to the ED if symptoms worsen  No work x 1 week    Influenza   AMBULATORY CARE:   Influenza  (the flu) is an infection caused by the influenza virus  The flu is easily spread when an infected person coughs, sneezes, or has close contact with others  You may be able to spread the flu to others for 1 week or longer after signs or symptoms appear  Common signs and symptoms include the following:   · Fever and chills    · Headaches, body aches, and muscle or joint pain    · Cough, runny nose, and sore throat    · Loss of appetite, nausea, vomiting, or diarrhea    · Tiredness    · Trouble breathing  Call 911 for any of the following:   · You have trouble breathing, and your lips look purple or blue  · You have a seizure  Seek care immediately if:   · You are dizzy, or you are urinating less or not at all  · You have a headache with a stiff neck, and you feel tired or confused  · You have new pain or pressure in your chest     · Your symptoms, such as shortness of breath, vomiting, or diarrhea, get worse  · Your symptoms, such as fever and coughing, seem to get better, but then get worse  Contact your healthcare provider if:   · You have new muscle pain or weakness  · You have questions or concerns about your condition or care  Treatment for influenza  may include any of the following:  · Acetaminophen  decreases pain and fever  It is available without a doctor's order  Ask how much to take and how often to take it  Follow directions  Acetaminophen can cause liver damage if not taken correctly  · NSAIDs , such as ibuprofen, help decrease swelling, pain, and fever   This medicine is available with or without a doctor's order  NSAIDs can cause stomach bleeding or kidney problems in certain people  If you take blood thinner medicine, always ask your healthcare provider if NSAIDs are safe for you  Always read the medicine label and follow directions  · Antivirals  help fight a viral infection  Manage your symptoms:   · Rest  as much as you can to help you recover  · Drink liquids as directed  to help prevent dehydration  Ask how much liquid to drink each day and which liquids are best for you  Prevent the spread of the flu:   · Wash your hands often  Use soap and water  Wash your hands after you use the bathroom, change a child's diapers, or sneeze  Wash your hands before you prepare or eat food  Use gel hand cleanser when soap and water are not available  Do not touch your eyes, nose, or mouth unless you have washed your hands first            · Cover your mouth when you sneeze or cough  Cough into a tissue or the bend of your arm  · Clean shared items with a germ-killing   Clean table surfaces, doorknobs, and light switches  Do not share towels, silverware, and dishes with people who are sick  Wash bed sheets, towels, silverware, and dishes with soap and water  · Wear a mask  over your mouth and nose if you are sick or are near anyone who is sick  · Stay away from others  if you are sick  · Influenza vaccine  helps prevent influenza (flu)  Everyone older than 6 months should get a yearly influenza vaccine  Get the vaccine as soon as it is available, usually in September or October each year  Follow up with your healthcare provider as directed:  Write down your questions so you remember to ask them during your visits  © 2017 [employer]  Information is for End User's use only and may not be sold, redistributed or otherwise used for commercial purposes   All illustrations and images included in CareNotes® are the copyrighted property of [employer]  or [employer]  The above information is an  only  It is not intended as medical advice for individual conditions or treatments  Talk to your doctor, nurse or pharmacist before following any medical regimen to see if it is safe and effective for you

## 2021-01-17 ENCOUNTER — NURSE TRIAGE (OUTPATIENT)
Dept: OTHER | Facility: OTHER | Age: 31
End: 2021-01-17

## 2021-01-17 DIAGNOSIS — Z20.828 EXPOSURE TO SARS VIRUS: Primary | ICD-10-CM

## 2021-01-17 NOTE — TELEPHONE ENCOUNTER
1  Were you within 6 feet or less, for up to 15 minutes or more with a person that has a confirmed COVID-19 test? Both parents are covid positive  2  What was the date of your exposure? 1/11/2021  3  Are you experiencing any symptoms attributed to the virus?  (Assess for SOB, cough, fever 101, difficulty breathing) fever, stuffy nose, aches  4   HIGH RISK: Do you have any history heart or lung conditions, weakened immune system, diabetes, Asthma, CHF, HIV, COPD, Chemo, renal failure, sickle cell, etc? no

## 2021-01-17 NOTE — TELEPHONE ENCOUNTER
Regarding: covid direct exposure symptomatic   ----- Message from Mendoza Davey sent at 1/17/2021 11:51 AM EST -----  covid test requested - patient was exposed to parents who tested positive on 1/11 and 1/13  Patient has headache, aches, stuffy nose, slight fever for 3 or so days

## 2021-01-17 NOTE — TELEPHONE ENCOUNTER
Reason for Disposition   Fever present > 3 days (72 hours)    Protocols used: CORONAVIRUS (COVID-19) DIAGNOSED OR SUSPECTED-ADULT-

## 2021-01-17 NOTE — TELEPHONE ENCOUNTER
Pt was a client of Dr Raul Mckeon but has not been seen in two years  He is going to call tomorrow to see if he is still an active patient

## 2021-01-18 DIAGNOSIS — Z20.828 EXPOSURE TO SARS VIRUS: ICD-10-CM

## 2021-01-18 PROCEDURE — U0005 INFEC AGEN DETEC AMPLI PROBE: HCPCS

## 2021-01-18 PROCEDURE — U0003 INFECTIOUS AGENT DETECTION BY NUCLEIC ACID (DNA OR RNA); SEVERE ACUTE RESPIRATORY SYNDROME CORONAVIRUS 2 (SARS-COV-2) (CORONAVIRUS DISEASE [COVID-19]), AMPLIFIED PROBE TECHNIQUE, MAKING USE OF HIGH THROUGHPUT TECHNOLOGIES AS DESCRIBED BY CMS-2020-01-R: HCPCS

## 2021-01-19 ENCOUNTER — NURSE TRIAGE (OUTPATIENT)
Dept: OTHER | Facility: OTHER | Age: 31
End: 2021-01-19

## 2021-01-19 LAB — SARS-COV-2 RNA RESP QL NAA+PROBE: POSITIVE

## 2021-01-19 NOTE — TELEPHONE ENCOUNTER
Your test for the novel coronavirus, also known as COVID-19, was positive  The sample showed that the virus was present  Positive COVID-19 test results are reportable to the [facility]  You may receive a call from trained public health staff to conduct an interview  It is important to answer their call  They will ask you to verify who you are  During the call they will ask you about what symptoms you have, what you did before you got sick, and who you were close to while sick  The health department does this to make sure everyone stays healthy and to reduce the spread of the virus  If you would like to verify if the caller does in fact work in contact tracing, call the [address] [place] [address] at [employer] ([phone])  For additional information, please visit the [place] website: www [email or web address] go[employer]     If you have any additional questions, we can schedule a virtual visit for you with a provider or call the [facility] [employer] [phone], option [phone], for care advice      Reason for Disposition   [1] COVID-19 diagnosed by positive lab test AND [2] mild symptoms (e g , cough, fever, others) AND [7] no complications or SOB    Answer Assessment - Initial Assessment Questions  1  COVID-19 DIAGNOSIS: "Who made your Coronavirus (COVID-19) diagnosis?" "Was it confirmed by a positive lab test?" If not diagnosed by a HCP, ask "Are there lots of cases (community spread) where you live?" (See [facility] department website, if unsure)      1/18/2021  2  COVID-19 EXPOSURE: "Was there any known exposure to COVID before the symptoms began?" CDC Definition of close contact: within 6 feet (2 meters) for a total of 15 minutes or more over a 24-hour period  Exposed to parents  3  ONSET: "When did the COVID-19 symptoms start?"       1/15/2021  4  WORST SYMPTOM: "What is your worst symptom?" (e g , cough, fever, shortness of breath, muscle aches)      Nasal congestion    5  COUGH: "Do you have a cough?" If so, ask: "How bad is the cough?"        Denied  6  FEVER: "Do you have a fever?" If so, ask: "What is your temperature, how was it measured, and when did it start?"      Denied  7  RESPIRATORY STATUS: "Describe your breathing?" (e g , shortness of breath, wheezing, unable to speak)       Normal  8  BETTER-SAME-WORSE: "Are you getting better, staying the same or getting worse compared to yesterday?"  If getting worse, ask, "In what way?"      Better  Protocols used: CORONAVIRUS (QSACQ-72)  DIAGNOSED OR SUSPECTED-ADULT-[place]    Patient verbalized understanding of the positive test result, care advice and isolation guidelines  Advised that CDC does not recommend retesting for at least 90 days after a positive Covid-19 test result because there may be traces of the virus remaining in the body  Patient declined scheduling a virtual appointment with an [address] [place] [address] provider  Patient denied need for further assistance

## 2021-01-27 ENCOUNTER — TELEPHONE (OUTPATIENT)
Dept: OTHER | Facility: OTHER | Age: 31
End: 2021-01-27

## 2021-01-27 NOTE — TELEPHONE ENCOUNTER
PT  Called in wondering if he is clear to go back to work from his covid isolation  If so he would like a call back and a letter to return

## 2021-01-29 ENCOUNTER — TELEMEDICINE (OUTPATIENT)
Dept: FAMILY MEDICINE CLINIC | Facility: CLINIC | Age: 31
End: 2021-01-29
Payer: COMMERCIAL

## 2021-01-29 VITALS — HEIGHT: 75 IN | BODY MASS INDEX: 39.17 KG/M2 | WEIGHT: 315 LBS

## 2021-01-29 DIAGNOSIS — U07.1 COVID-19: Primary | ICD-10-CM

## 2021-01-29 PROCEDURE — 99212 OFFICE O/P EST SF 10 MIN: CPT | Performed by: PHYSICIAN ASSISTANT

## 2021-01-29 NOTE — LETTER
January 29, 2021     Patient: Dean Rodriguez   YOB: 1990   Date of Visit: 1/29/2021     2To Whom it May Concern:    Dean Rodriguez is under my professional care  He was seen in my office for a virtual vist on 1/29/2021  He tested positive for COVID-19 on 1/18/2021  He may return to work on 2/1/2021  If you have any questions or concerns, please don't hesitate to call           Sincerely,          Adri Griffin PA-C

## 2021-01-29 NOTE — PROGRESS NOTES
COVID-19 Virtual Visit     Assessment/Plan:    Problem List Items Addressed This Visit     None      Visit Diagnoses     COVID-19    -  Primary         Disposition:     I recommended continued isolation until at least 24 hours have passed since recovery defined as resolution of fever without the use of fever-reducing medications AND improvement in COVID symptoms AND 10 days have passed since onset of symptoms (or 10 days have passed since date of first positive viral diagnostic test for asymptomatic patients)  It has been > 10 days from onset of symptoms and he has been fever free for > 24 hours  He is cleared to return to work  He will notify us of any concerns or problems  Otherwise, we will see him in the office for a follow-up to establish care as scheduled  I have spent 10 minutes directly with the patient  Encounter provider Marvin Willson PA-C    Provider located at [address] [address]    Recent Visits  No visits were found meeting these conditions  Showing recent visits within past 7 days and meeting all other requirements     Today's Visits  Date Type Provider Dept   01/29/21 Telemedicine Marvin Willson PA-C  [facility] Fleming   Showing today's visits and meeting all other requirements     Future Appointments  No visits were found meeting these conditions  Showing future appointments within next 150 days and meeting all other requirements      This virtual check-in was done via [employer] and patient was informed that this is a secure, HIPAA-compliant platform  He agrees to proceed  Patient agrees to participate in a virtual check in via telephone or video visit instead of presenting to the office to address urgent/immediate medical needs  Patient is aware this is a billable service  After connecting through [facility], the patient was identified by name and date of birth   Brooks Rosenbaum was informed that this was a telemedicine visit and that the exam was being conducted confidentially over secure lines  My office door was closed  No one else was in the room  Romina Gonzalez acknowledged consent and understanding of privacy and security of the telemedicine visit  I informed the patient that I have reviewed his record in Epic and presented the opportunity for him to ask any questions regarding the visit today  The patient agreed to participate  Subjective:   Romina Gonzalez is a 27 y o  male who has been screened for COVID-19  Symptom change since last report: resolving  Patient denies fever, chills, fatigue, congestion, rhinorrhea, sore throat, anosmia, loss of taste, cough, shortness of breath, chest tightness, abdominal pain, nausea, vomiting, diarrhea, myalgias and headaches  Claire Cooper has been staying home and has isolated themselves in his home  He is taking care to not share personal items and is cleaning all surfaces that are touched often, like counters, tabletops, and doorknobs using household cleaning sprays or wipes  He is wearing a mask when he leaves his room  Date of symptom onset: 1/15/2021  Date of positive COVID-19 PCR: 1/18/2021    Claire Cooper is here today as a new patient because he had COVID and needs a note to return to work  He was tested at [address] [place] [address]  He started with nausea on 1/15/21  He got tested because his mother tested positive  He admits that the nausea resolved  He did not have any other symptoms throughout his course of illness  He denies any other concerns at this time  Lab Results   Component Value Date    SARSCOV2 Positive (A) 01/18/2021     Past Medical History:   Diagnosis Date    Asthma      Past Surgical History:   Procedure Laterality Date    NO PAST SURGERIES       No current outpatient medications on file  No current facility-administered medications for this visit        Allergies   Allergen Reactions    Amoxicillin     Penicillin G        Review of Systems   Constitutional: Negative for chills, diaphoresis, fatigue and fever  HENT: Negative for congestion, ear pain, postnasal drip, rhinorrhea, sneezing, sore throat and trouble swallowing  Eyes: Negative for pain and visual disturbance  Respiratory: Negative for apnea, cough, chest tightness, shortness of breath and wheezing  Cardiovascular: Negative for chest pain and palpitations  Gastrointestinal: Negative for abdominal pain, constipation, diarrhea, nausea and vomiting  Genitourinary: Negative for dysuria and hematuria  Musculoskeletal: Negative for arthralgias, gait problem and myalgias  Neurological: Negative for dizziness, syncope, weakness, light-headedness, numbness and headaches  Psychiatric/Behavioral: Negative for suicidal ideas  The patient is not nervous/anxious  Objective:    Vitals:    01/29/21 0714   Weight: (!) 181 kg (400 lb)   Height: 6' 3" (1 905 m)       Physical Exam  Vitals signs and nursing note reviewed  Constitutional:       General: He is not in acute distress  Appearance: He is well-developed  He is not ill-appearing, toxic-appearing or diaphoretic  HENT:      Head: Normocephalic and atraumatic  Pulmonary:      Effort: Pulmonary effort is normal  No respiratory distress (Patient is speaking in full, fluent sentences  He does not appear in any acute distress)  Neurological:      Mental Status: He is alert  Psychiatric:         Behavior: Behavior normal  Behavior is cooperative  Thought Content: Thought content normal          Judgment: Judgment normal        VIRTUAL VISIT DISCLAIMER    Mary Engel acknowledges that he has consented to an online visit or consultation  He understands that the online visit is based solely on information provided by him, and that, in the absence of a face-to-face physical evaluation by the physician, the diagnosis he receives is both limited and provisional in terms of accuracy and completeness   This is not intended to replace a full medical face-to-face evaluation by the physician  Kae Scheuermann understands and accepts these terms